# Patient Record
Sex: FEMALE | Race: WHITE | Employment: FULL TIME | URBAN - METROPOLITAN AREA
[De-identification: names, ages, dates, MRNs, and addresses within clinical notes are randomized per-mention and may not be internally consistent; named-entity substitution may affect disease eponyms.]

---

## 2017-08-31 ENCOUNTER — OFFICE VISIT (OUTPATIENT)
Dept: FAMILY MEDICINE CLINIC | Facility: CLINIC | Age: 47
End: 2017-08-31

## 2017-08-31 VITALS
HEART RATE: 72 BPM | SYSTOLIC BLOOD PRESSURE: 110 MMHG | BODY MASS INDEX: 20.31 KG/M2 | TEMPERATURE: 98 F | RESPIRATION RATE: 16 BRPM | WEIGHT: 134 LBS | HEIGHT: 68 IN | DIASTOLIC BLOOD PRESSURE: 78 MMHG

## 2017-08-31 DIAGNOSIS — B00.89 HERPES DERMATITIS: Primary | ICD-10-CM

## 2017-08-31 PROCEDURE — 99213 OFFICE O/P EST LOW 20 MIN: CPT | Performed by: FAMILY MEDICINE

## 2017-08-31 RX ORDER — ACYCLOVIR 800 MG/1
TABLET ORAL
Qty: 30 TABLET | Refills: 5 | Status: SHIPPED | OUTPATIENT
Start: 2017-08-31

## 2017-08-31 NOTE — PATIENT INSTRUCTIONS
Acyclovir 800 mg twice per day for 5 days at the onset of symptoms  Follow up if with derm if desired  Follow up for Well Woman exam with PCP Detail Level: Generalized

## 2017-08-31 NOTE — PROGRESS NOTES
HPI:   Evin Alexander is a 51year old female that presents for rash on right buttock. This is a recurrent rash started 3 years ago. Occurs intermittently for a few days often worse when patient is stressed or sick.   It starts as a burning tingling sen DO  8/31/2017  12:24 PM

## 2020-10-14 ENCOUNTER — HOSPITAL ENCOUNTER (OUTPATIENT)
Age: 50
Discharge: HOME | End: 2020-10-14
Payer: COMMERCIAL

## 2020-10-14 VITALS
RESPIRATION RATE: 16 BRPM | TEMPERATURE: 99 F | SYSTOLIC BLOOD PRESSURE: 136 MMHG | DIASTOLIC BLOOD PRESSURE: 89 MMHG | HEART RATE: 72 BPM | OXYGEN SATURATION: 97 %

## 2020-10-14 DIAGNOSIS — Z20.822 ENCOUNTER FOR LABORATORY TESTING FOR COVID-19 VIRUS: Primary | ICD-10-CM

## 2020-10-14 PROCEDURE — 99203 OFFICE O/P NEW LOW 30 MIN: CPT | Performed by: PHYSICIAN ASSISTANT

## 2020-10-14 NOTE — ED PROVIDER NOTES
Patient Seen in: THE [facility]      History   Patient presents with:  Testing    Stated Complaint: COVID test    HPI    58-year-old female here for Covid test.  Patient reports that her girlfriends  was positive although she Conjunctivae normal.      Pupils: Pupils are equal, round, and reactive to light. Neck:      Musculoskeletal: Normal range of motion and neck supple. Cardiovascular:      Rate and Rhythm: Normal rate and regular rhythm.       Heart sounds: Normal heart

## 2020-10-14 NOTE — ED INITIAL ASSESSMENT (HPI)
Patient here for covid testing. States her friend's  who she was with on Sunday tested positive. (Her friend also no symptoms)Her mother is 80 years.

## 2021-07-27 PROBLEM — M79.605 LEG PAIN, LEFT: Status: ACTIVE | Noted: 2021-07-27

## 2023-02-02 ENCOUNTER — APPOINTMENT (OUTPATIENT)
Dept: ULTRASOUND IMAGING | Age: 53
End: 2023-02-02
Attending: PHYSICIAN ASSISTANT
Payer: COMMERCIAL

## 2023-02-02 ENCOUNTER — HOSPITAL ENCOUNTER (OUTPATIENT)
Age: 53
Discharge: HOME | End: 2023-02-02
Payer: COMMERCIAL

## 2023-02-02 VITALS
HEART RATE: 77 BPM | WEIGHT: 130 LBS | SYSTOLIC BLOOD PRESSURE: 133 MMHG | BODY MASS INDEX: 20.65 KG/M2 | OXYGEN SATURATION: 99 % | HEIGHT: 66.5 IN | TEMPERATURE: 99 F | DIASTOLIC BLOOD PRESSURE: 70 MMHG | RESPIRATION RATE: 16 BRPM

## 2023-02-02 DIAGNOSIS — I82.4Y1 DEEP VEIN THROMBOSIS (DVT) OF PROXIMAL VEIN OF RIGHT LOWER EXTREMITY, UNSPECIFIED CHRONICITY (HCC): Primary | ICD-10-CM

## 2023-02-02 PROCEDURE — 93971 EXTREMITY STUDY: CPT | Performed by: PHYSICIAN ASSISTANT

## 2023-02-02 PROCEDURE — 99215 OFFICE O/P EST HI 40 MIN: CPT

## 2023-02-02 PROCEDURE — 99214 OFFICE O/P EST MOD 30 MIN: CPT

## 2023-02-02 RX ORDER — RIVAROXABAN 10 MG/1
TABLET, FILM COATED ORAL
COMMUNITY
Start: 2022-10-11

## 2023-02-02 NOTE — ED INITIAL ASSESSMENT (HPI)
C/o possible blood clot. PMH of blood clot. Pt had blood clot 06/2022. Pt takes blood thinner intermittently. Pt currently on blood thinner. Pt  directed her to come here.

## 2023-10-20 ENCOUNTER — OFFICE VISIT (OUTPATIENT)
Dept: RHEUMATOLOGY | Facility: CLINIC | Age: 53
End: 2023-10-20
Payer: COMMERCIAL

## 2023-10-20 VITALS
SYSTOLIC BLOOD PRESSURE: 122 MMHG | RESPIRATION RATE: 16 BRPM | WEIGHT: 136 LBS | HEART RATE: 70 BPM | TEMPERATURE: 99 F | BODY MASS INDEX: 21.35 KG/M2 | HEIGHT: 67 IN | DIASTOLIC BLOOD PRESSURE: 80 MMHG | OXYGEN SATURATION: 99 %

## 2023-10-20 DIAGNOSIS — R77.8 LOW SERUM COMPLEMENT C4: ICD-10-CM

## 2023-10-20 DIAGNOSIS — R76.8 ANTI-RNP ANTIBODIES PRESENT: Primary | ICD-10-CM

## 2023-10-20 DIAGNOSIS — L65.9 ALOPECIA: ICD-10-CM

## 2023-10-20 DIAGNOSIS — R76.8 POSITIVE ANA (ANTINUCLEAR ANTIBODY): ICD-10-CM

## 2023-10-20 DIAGNOSIS — M25.541 ARTHRALGIA OF RIGHT HAND: ICD-10-CM

## 2023-10-20 DIAGNOSIS — R53.83 OTHER FATIGUE: ICD-10-CM

## 2023-10-20 DIAGNOSIS — M25.50 ARTHRALGIA, UNSPECIFIED JOINT: ICD-10-CM

## 2023-10-20 PROCEDURE — 99204 OFFICE O/P NEW MOD 45 MIN: CPT | Performed by: INTERNAL MEDICINE

## 2023-10-20 PROCEDURE — 3079F DIAST BP 80-89 MM HG: CPT | Performed by: INTERNAL MEDICINE

## 2023-10-20 PROCEDURE — 3008F BODY MASS INDEX DOCD: CPT | Performed by: INTERNAL MEDICINE

## 2023-10-20 PROCEDURE — 3074F SYST BP LT 130 MM HG: CPT | Performed by: INTERNAL MEDICINE

## 2023-10-20 NOTE — PATIENT INSTRUCTIONS
Get blood work in 3 weeks (early Nov 2023). Get at 7-9 AM. For the cortisol level. Call THE [facility] scheduling line to set the lab/imaging/procedure appointment up. Phone number is [phone].

## 2023-10-22 PROBLEM — R76.8 ANTI-RNP ANTIBODIES PRESENT: Status: ACTIVE | Noted: 2023-10-22

## 2023-11-14 ENCOUNTER — LAB ENCOUNTER (OUTPATIENT)
Dept: LAB | Age: 53
End: 2023-11-14
Attending: INTERNAL MEDICINE
Payer: COMMERCIAL

## 2023-11-14 DIAGNOSIS — L65.9 ALOPECIA: ICD-10-CM

## 2023-11-14 DIAGNOSIS — R76.8 POSITIVE ANA (ANTINUCLEAR ANTIBODY): ICD-10-CM

## 2023-11-14 DIAGNOSIS — M25.541 ARTHRALGIA OF RIGHT HAND: ICD-10-CM

## 2023-11-14 DIAGNOSIS — R77.8 LOW SERUM COMPLEMENT C4: ICD-10-CM

## 2023-11-14 DIAGNOSIS — M25.50 ARTHRALGIA, UNSPECIFIED JOINT: ICD-10-CM

## 2023-11-14 DIAGNOSIS — R76.8 ANTI-RNP ANTIBODIES PRESENT: ICD-10-CM

## 2023-11-14 LAB
CORTIS SERPL-MCNC: 21.2 UG/DL
CRP SERPL-MCNC: <0.29 MG/DL (ref ?–0.3)
DEPRECATED HBV CORE AB SER IA-ACNC: 75.1 NG/ML
ERYTHROCYTE [SEDIMENTATION RATE] IN BLOOD: 5 MM/HR
FOLATE SERPL-MCNC: 23.3 NG/ML (ref 8.7–?)
IRON SATN MFR SERPL: 32 %
IRON SERPL-MCNC: 105 UG/DL
TIBC SERPL-MCNC: 329 UG/DL (ref 240–450)
TRANSFERRIN SERPL-MCNC: 221 MG/DL (ref 200–360)
URATE SERPL-MCNC: 3.6 MG/DL
VIT B12 SERPL-MCNC: 448 PG/ML (ref 193–986)

## 2023-11-14 PROCEDURE — 83550 IRON BINDING TEST: CPT

## 2023-11-14 PROCEDURE — 86140 C-REACTIVE PROTEIN: CPT

## 2023-11-14 PROCEDURE — 86235 NUCLEAR ANTIGEN ANTIBODY: CPT

## 2023-11-14 PROCEDURE — 82728 ASSAY OF FERRITIN: CPT

## 2023-11-14 PROCEDURE — 82607 VITAMIN B-12: CPT

## 2023-11-14 PROCEDURE — 85652 RBC SED RATE AUTOMATED: CPT

## 2023-11-14 PROCEDURE — 86332 IMMUNE COMPLEX ASSAY: CPT

## 2023-11-14 PROCEDURE — 84550 ASSAY OF BLOOD/URIC ACID: CPT

## 2023-11-14 PROCEDURE — 82746 ASSAY OF FOLIC ACID SERUM: CPT

## 2023-11-14 PROCEDURE — 82533 TOTAL CORTISOL: CPT

## 2023-11-14 PROCEDURE — 83540 ASSAY OF IRON: CPT

## 2023-11-14 PROCEDURE — 36415 COLL VENOUS BLD VENIPUNCTURE: CPT

## 2023-11-15 LAB
ENA RNP IGG SER IA-ACNC: 0.4 U/ML
U1 SNRNP IGG SER IA-ACNC: 1 U/ML

## 2023-11-16 LAB — C1Q BIND IMMUNE COMPLEX: <1.2 UG EQ/ML

## 2025-06-10 ENCOUNTER — HOSPITAL ENCOUNTER (OUTPATIENT)
Age: 55
Discharge: HOME | End: 2025-06-10
Payer: COMMERCIAL

## 2025-06-10 VITALS
RESPIRATION RATE: 18 BRPM | HEART RATE: 66 BPM | TEMPERATURE: 98 F | OXYGEN SATURATION: 99 % | SYSTOLIC BLOOD PRESSURE: 137 MMHG | DIASTOLIC BLOOD PRESSURE: 92 MMHG

## 2025-06-10 DIAGNOSIS — H00.014 HORDEOLUM EXTERNUM OF LEFT UPPER EYELID: Primary | ICD-10-CM

## 2025-06-10 DIAGNOSIS — L03.213 PRESEPTAL CELLULITIS OF LEFT EYE: ICD-10-CM

## 2025-06-10 PROCEDURE — 99214 OFFICE O/P EST MOD 30 MIN: CPT

## 2025-06-10 PROCEDURE — 99213 OFFICE O/P EST LOW 20 MIN: CPT

## 2025-06-10 RX ORDER — ERYTHROMYCIN 5 MG/G
1 OINTMENT OPHTHALMIC EVERY 6 HOURS
Qty: 1 G | Refills: 0 | Status: SHIPPED | OUTPATIENT
Start: 2025-06-10 | End: 2025-06-17

## 2025-06-10 NOTE — ED PROVIDER NOTES
Patient Seen in: [facility] [place]        History  Chief Complaint   Patient presents with    Eye Visual Problem     Stated Complaint: Eye Infection    Subjective:   HPI  53 yo female presents with L upper eyelid redness and swelling for the past couple of days. She also has L sided preauricular lymphadenopathy that is tender. 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 Previous hx of styes.      Objective:     Past Medical History:    Acute UTI (urinary tract infection)    DVT (deep vein thrombosis)              Past Surgical History:   Procedure Laterality Date    Cholecystectomy      Other surgical history  1/1/2006    Breast Augmentaion                Social History     Socioeconomic History    Marital status:    Tobacco Use    Smoking status: Never    Smokeless tobacco: Never   Vaping Use    Vaping status: Never Used   Substance and Sexual Activity    Alcohol use: Yes     Comment: rarely    Drug use: Never     Social Drivers of Health     Food Insecurity: No Food Insecurity (2/18/2022)    Received from [facility]    Hunger Vital Sign     Worried About Running Out of Food in the Last Year: Never true     Ran Out of Food in the Last Year: Never true   Transportation Needs: No Transportation Needs (2/18/2022)    Received from [facility]    PRAPARE - Transportation     Lack of Transportation (Medical): No     Lack of Transportation (Non-Medical): No    Received from [facility]              Review of Systems   All other systems reviewed and are negative.      Positive for stated complaint: Eye Infection  Other systems are as noted in HPI.  Constitutional and vital signs reviewed.      All other systems reviewed and negative except as noted above.                  Physical Exam    ED Triage Vitals [06/10/25 0917]   BP (!) 137/92   Pulse 66   Resp 18   Temp 98 °F (36.7 °C)   Temp src Oral   SpO2 99 %   O2 Device None (Room  air)       Current Vitals:   Vital Signs  BP: (!) 137/92  Pulse: 66  Resp: 18  Temp: 98 °F (36.7 °C)  Temp src: Oral    Oxygen Therapy  SpO2: 99 %  O2 Device: None (Room air)      Right Eye Chart Acuity: 20/20, Corrected  Left Eye Chart Acuity: 20/50, Corrected      Physical Exam  Vitals and nursing note reviewed.   Constitutional:       General: She is not in acute distress.     Appearance: She is well-developed. She is not ill-appearing or toxic-appearing.   HENT:      Right Ear: Tympanic membrane, ear canal and external ear normal.      Left Ear: Tympanic membrane, ear canal and external ear normal.      Nose: No congestion or rhinorrhea.   Eyes:      Extraocular Movements: Extraocular movements intact.      Pupils: Pupils are equal, round, and reactive to light.      Comments: L upper eyelid with mid stye and surrounding redness and tenderness. L preauricular lymphadenopathy   Cardiovascular:      Rate and Rhythm: Normal rate.   Pulmonary:      Effort: Pulmonary effort is normal.   Skin:     General: Skin is warm and dry.   Neurological:      Mental Status: She is alert and oriented to person, place, and time.                 ED Course  Labs Reviewed - No data to display                           Medical Decision Making  Complaint of eyelid pain and redness.  Differential diagnosis: stye, blepharitis, preseptal cellulitis, orbital cellulitis.  Diagnosed with hordeolum and preseptal cellulitis and will treat with erythromycin ointment and augmentin with optho follow up as needed.    No proptosis or pain with eom.    Disposition and Plan     Clinical Impression:  1. Hordeolum externum of left upper eyelid    2. Preseptal cellulitis of left eye         Disposition:  Discharge  6/10/2025  9:27 am    Follow-up:  [facility] [place] [address] [place] [address] [phone]  Call   As needed          Medications Prescribed:  Discharge Medication List as of 6/10/2025  9:30 AM         START taking these medications    Details   erythromycin 5 MG/GM Ophthalmic Ointment Apply 1 Application to eye every 6 (six) hours for 7 days., Normal, Disp-1 g, R-0      amoxicillin clavulanate 875-125 MG Oral Tab Take 1 tablet by mouth 2 (two) times daily for 10 days., Normal, Disp-20 tablet, R-0                   Supplementary Documentation:

## 2025-06-10 NOTE — ED INITIAL ASSESSMENT (HPI)
Left eye stye x 2 days, now will swollen lymph node.     Applying warm compresses.    Has upcoming half-ironman.